# Patient Record
Sex: FEMALE | Race: WHITE | NOT HISPANIC OR LATINO
[De-identification: names, ages, dates, MRNs, and addresses within clinical notes are randomized per-mention and may not be internally consistent; named-entity substitution may affect disease eponyms.]

---

## 2021-03-15 PROBLEM — Z00.00 ENCOUNTER FOR PREVENTIVE HEALTH EXAMINATION: Status: ACTIVE | Noted: 2021-03-15

## 2021-03-29 ENCOUNTER — APPOINTMENT (OUTPATIENT)
Dept: OBGYN | Facility: CLINIC | Age: 31
End: 2021-03-29
Payer: OTHER GOVERNMENT

## 2021-03-29 VITALS — WEIGHT: 113 LBS | TEMPERATURE: 97.6 F | HEIGHT: 60 IN | BODY MASS INDEX: 22.19 KG/M2

## 2021-03-29 DIAGNOSIS — R10.2 PELVIC AND PERINEAL PAIN: ICD-10-CM

## 2021-03-29 DIAGNOSIS — N89.8 OTHER SPECIFIED NONINFLAMMATORY DISORDERS OF VAGINA: ICD-10-CM

## 2021-03-29 LAB
BILIRUB UR QL STRIP: NORMAL
CLARITY UR: CLEAR
GLUCOSE UR-MCNC: NORMAL
HCG UR QL: NORMAL EU/DL
HGB UR QL STRIP.AUTO: NORMAL
KETONES UR-MCNC: NORMAL
LEUKOCYTE ESTERASE UR QL STRIP: NORMAL
NITRITE UR QL STRIP: NORMAL
PH UR STRIP: 5
PROT UR STRIP-MCNC: NORMAL
SP GR UR STRIP: 1.02

## 2021-03-29 PROCEDURE — 81003 URINALYSIS AUTO W/O SCOPE: CPT | Mod: QW

## 2021-03-29 PROCEDURE — 99385 PREV VISIT NEW AGE 18-39: CPT

## 2021-03-29 PROCEDURE — 99072 ADDL SUPL MATRL&STAF TM PHE: CPT

## 2021-03-30 ENCOUNTER — LABORATORY RESULT (OUTPATIENT)
Age: 31
End: 2021-03-30

## 2021-04-06 ENCOUNTER — NON-APPOINTMENT (OUTPATIENT)
Age: 31
End: 2021-04-06

## 2021-04-06 LAB
A VAGINAE DNA VAG QL NAA+PROBE: NORMAL
BVAB2 DNA VAG QL NAA+PROBE: NORMAL
C KRUSEI DNA VAG QL NAA+PROBE: NEGATIVE
C TRACH RRNA SPEC QL NAA+PROBE: NEGATIVE
CYTOLOGY CVX/VAG DOC THIN PREP: NORMAL
HPV HIGH+LOW RISK DNA PNL CVX: DETECTED
MEGA1 DNA VAG QL NAA+PROBE: NORMAL
N GONORRHOEA RRNA SPEC QL NAA+PROBE: NEGATIVE
T VAGINALIS RRNA SPEC QL NAA+PROBE: NEGATIVE

## 2021-04-12 ENCOUNTER — APPOINTMENT (OUTPATIENT)
Dept: OBGYN | Facility: CLINIC | Age: 31
End: 2021-04-12
Payer: OTHER GOVERNMENT

## 2021-04-12 ENCOUNTER — LABORATORY RESULT (OUTPATIENT)
Age: 31
End: 2021-04-12

## 2021-04-12 VITALS — HEIGHT: 60 IN | WEIGHT: 113 LBS | BODY MASS INDEX: 22.19 KG/M2 | TEMPERATURE: 97.7 F

## 2021-04-12 DIAGNOSIS — Z86.19 PERSONAL HISTORY OF OTHER INFECTIOUS AND PARASITIC DISEASES: ICD-10-CM

## 2021-04-12 PROCEDURE — 57454 BX/CURETT OF CERVIX W/SCOPE: CPT

## 2021-04-12 PROCEDURE — 99072 ADDL SUPL MATRL&STAF TM PHE: CPT

## 2021-04-12 NOTE — PROCEDURE
[Colposcopy] : Colposcopy  [Consent Obtained] : Consent obtained [Risks] : risks [Benefits] : benefits [Alternatives] : alternatives [Patient] : patient [HPV High Risk] : HPV high risk [No Premedication] : no premedication [Colposcopy Adequate] : colposcopy adequate [Pap Performed] : pap not performed [SCI Fully Visualized] : SCI fully visualized [No Abnormalities] : no abnormalities [Biopsy] : biopsy taken [Hemostasis Obtained] : Hemostasis obtained [Tolerated Well] : the patient tolerated the procedure well [de-identified] : Pap normal [de-identified] : 2 [de-identified] : (ECC) Endocervix and Cervix at 1 oclock [de-identified] : Monsels and pressure [de-identified] : HPV effect

## 2021-04-19 ENCOUNTER — NON-APPOINTMENT (OUTPATIENT)
Age: 31
End: 2021-04-19

## 2021-04-29 ENCOUNTER — APPOINTMENT (OUTPATIENT)
Dept: OBGYN | Facility: CLINIC | Age: 31
End: 2021-04-29
Payer: OTHER GOVERNMENT

## 2021-04-29 VITALS — HEIGHT: 60 IN | BODY MASS INDEX: 22.19 KG/M2 | TEMPERATURE: 97.8 F | WEIGHT: 113 LBS

## 2021-04-29 DIAGNOSIS — N92.0 EXCESSIVE AND FREQUENT MENSTRUATION WITH REGULAR CYCLE: ICD-10-CM

## 2021-04-29 PROCEDURE — 99072 ADDL SUPL MATRL&STAF TM PHE: CPT

## 2021-04-29 PROCEDURE — 58300 INSERT INTRAUTERINE DEVICE: CPT

## 2021-04-29 PROCEDURE — 58301 REMOVE INTRAUTERINE DEVICE: CPT

## 2021-04-29 NOTE — PROCEDURE
[IUD Placement] : intrauterine device (IUD) placement [Consent Obtained] : Consent obtained [Prevention of Pregnancy] : prevention of pregnancy [Abnormal Uterine Bleeding] : abnormal uterine bleeding [No Premedication] : No premedication [Tenaculum] : Tenaculum [Easy Passage] : Easy passage [Mirena IUD] : Mirena IUD [None] : None [IUD Removal] : intrauterine device (IUD) removal [Menorrhagia] : menorrhagia [Risks] : risks [Benefits] : benefits [Alternatives] : alternatives [Patient] : patient [Speculum Placed] : speculum placed [IUD Removed - Forceps] : IUD removed - forceps [IUD Discarded] : IUD discarded [Tolerated Well] : Patient tolerated the procedure well [No Complications] : no complications [___ Week(s)] : in [unfilled] week(s) [LMPDate] : now [de-identified] : [id number] [de-identified] : May 2023

## 2021-05-13 ENCOUNTER — APPOINTMENT (OUTPATIENT)
Dept: OBGYN | Facility: CLINIC | Age: 31
End: 2021-05-13

## 2022-08-15 ENCOUNTER — EMERGENCY (EMERGENCY)
Facility: HOSPITAL | Age: 32
LOS: 0 days | Discharge: HOME | End: 2022-08-15
Attending: EMERGENCY MEDICINE | Admitting: EMERGENCY MEDICINE

## 2022-08-15 VITALS
WEIGHT: 108.03 LBS | OXYGEN SATURATION: 100 % | DIASTOLIC BLOOD PRESSURE: 59 MMHG | HEART RATE: 95 BPM | RESPIRATION RATE: 18 BRPM | TEMPERATURE: 98 F | SYSTOLIC BLOOD PRESSURE: 116 MMHG

## 2022-08-15 DIAGNOSIS — S05.01XA INJURY OF CONJUNCTIVA AND CORNEAL ABRASION WITHOUT FOREIGN BODY, RIGHT EYE, INITIAL ENCOUNTER: ICD-10-CM
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
## 2022-08-15 DIAGNOSIS — H57.11 OCULAR PAIN, RIGHT EYE: ICD-10-CM

## 2022-08-15 DIAGNOSIS — H57.89 OTHER SPECIFIED DISORDERS OF EYE AND ADNEXA: ICD-10-CM
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
## 2022-08-15 PROCEDURE — 99283 EMERGENCY DEPT VISIT LOW MDM: CPT

## 2022-08-15 RX ORDER — FLUORESCEIN SODIUM 9 MG
1 STRIP OPHTHALMIC (EYE) ONCE
Refills: 0 | Status: COMPLETED | OUTPATIENT
Start: 2022-08-15 | End: 2022-08-15

## 2022-08-15 RX ORDER — FAMOTIDINE 10 MG/ML
1 INJECTION INTRAVENOUS
Qty: 5 | Refills: 0
Start: 2022-08-15 | End: 2022-08-19

## 2022-08-15 RX ORDER — KETOROLAC TROMETHAMINE 30 MG/ML
1 SYRINGE (ML) INJECTION
Qty: 15 | Refills: 0
Start: 2022-08-15 | End: 2022-08-19

## 2022-08-15 RX ORDER — POLYMYXIN B SULF/TRIMETHOPRIM 10000-1/ML
1 DROPS OPHTHALMIC (EYE) ONCE
Refills: 0 | Status: COMPLETED | OUTPATIENT
Start: 2022-08-15 | End: 2022-08-15

## 2022-08-15 RX ADMIN — Medication 1 DROP(S): at 13:14

## 2022-08-15 RX ADMIN — Medication 1 DROP(S): at 13:33

## 2022-08-15 RX ADMIN — Medication 1 APPLICATION(S): at 13:14

## 2022-08-15 NOTE — ED PROVIDER NOTE - NSFOLLOWUPCLINICS_GEN_ALL_ED_FT
[facility]  Ophthalmolgy  [address], [address]  [place] [address]  Phone: [phone]  Fax:   Follow Up Time: 1-3 Days

## 2022-08-15 NOTE — ED PROVIDER NOTE - OBJECTIVE STATEMENT
32-year-old female presents for traumatic right eye pain.  Patient was playing with her 2-year-old son yesterday when her son accidentally shot the cardboard box into her right eye.  Patient reports pain at right eye, with excessive tearing and redness.  Patient went to urgent care yesterday and had stain done showing corneal abrasion, was told she may need to be seen by an ophthalmologist.  Patient presents to ED with persistent pain.  Denies blurry vision, pain with eye movement, fever, chills, chest pain, shortness of breath, all other complaints.  No other injuries.  Patient does not wear glasses or contacts at baseline.

## 2022-08-15 NOTE — ED PROVIDER NOTE - CLINICAL SUMMARY MEDICAL DECISION MAKING FREE TEXT BOX
31-year-old female presents to the ED for traumatic right eye pain.  He was playing with her 2-year-old son and was hit with a cardboard box in the right eye.  Excessive tearing.  He was seen in the urgent care yesterday and diagnosed with corneal abrasion.  Followed back to the ED today for persistent pain.  Physical exam revealed 20/20 in both vision.  Pupils equal reactive.  Fluorescein stain revealed corneal abrasion at 30 4:00 region of the cornea.  Overall patient is well-appearing.  Toradol tablets prescribed for the patient.  Ophthalmology follow-up given.  Discharged home.

## 2022-08-15 NOTE — ED ADULT TRIAGE NOTE - CHIEF COMPLAINT QUOTE
Pt c/o corneal abrasions and pupil abrasion in R eye, Pt was seen in [facility], was recommended to be seen by ophthalmologist. Pt c/o R eye tearing and pain

## 2022-08-15 NOTE — ED PROVIDER NOTE - PATIENT PORTAL LINK FT
You can access the FollowMyHealth Patient Portal offered by [facility] by registering at the following website: [email or web address]. By joining [facility]’s FollowMyHealth portal, you will also be able to view your health information using other applications (apps) compatible with our system.

## 2022-08-15 NOTE — ED PROVIDER NOTE - NSFOLLOWUPINSTRUCTIONS_ED_ALL_ED_FT
APPLY POLYTRIM DROPS TO RIGHT EYE (2 DROPS) THREE TIMES A DAY FOR 5 DAYS. FOLLOW UP IN THE OPHTHALMOLOGY CLINIC.     Corneal Abrasion  A corneal abrasion is a scratch or injury to the clear covering over the front of your eye (cornea). Your cornea forms a clear dome that protects your eye and helps to focus your vision. Your cornea is made up of many layers. The surface layer is a single layer of cells (corneal epithelium). It is one of the most sensitive tissues in your body. A corneal abrasion can be very painful.    If a corneal abrasion is not treated, it can become infected and cause an ulcer. This can lead to scarring. A scarred cornea can affect your vision. Sometimes abrasions come back in the same area, even after the original injury has healed (recurrent erosion syndrome).    What are the causes?  This condition may be caused by:  A poke in the eye.  A gritty or irritating substance (foreign body) in the eye.  Excessive eye rubbing.  Very dry eyes.  Certain eye infections.  Contact lenses that fit poorly or are worn for a long period of time. You can also injure your cornea when putting contacts lenses in your eye or taking them out.  Eye surgery.  Sometimes, the cause is unknown.    What are the signs or symptoms?  Symptoms of this condition include:  Eye pain. The pain may get worse when your eye is open or when you move your eye.  A feeling of something stuck in your eye.  Having trouble keeping your eye open, or not being able to keep it open.  Tearing and redness.  Sensitivity to light.  Blurred vision.  Headache.  How is this diagnosed?  This condition may be diagnosed based on:  Your medical history.  Your symptoms.  An eye exam. You may work with a health care provider who specializes in diseases and conditions of the eye (ophthalmologist). Before the eye exam, numbing drops may be put into your eye. You may also have dye put in your eye with a dropper or a small paper strip. The dye makes the abrasion easy to see when your ophthalmologist examines your eye with a light. Your ophthalmologist may look at your eye through an eye scope (slit lamp).  How is this treated?  Treatment may vary depending on the cause of your condition, and it may include:  Washing out your eye.  Removing any foreign body.  Antibiotic drops or ointment to treat an infection.  Steroid drops or ointment to treat redness, irritation, or inflammation.  Pain medicine.  An eye patch to keep your eye closed.  Follow these instructions at home:  Medicines     Use eye drops or ointments as told by your eye care provider.  If you were prescribed antibiotic drops or ointment, use them as told by your eye care provider. Do not stop using the antibiotic even if you start to feel better.  Take over-the-counter and prescription medicines only as told by your eye care provider.  Do not drive or use heavy machinery while taking prescription pain medicine.  General instructions     If you have an eye patch, wear it as told by your eye care provider.  Do not drive or use machinery while wearing an eye patch. Your ability to  distances will be impaired.  Follow instructions from your eye care provider about when to remove the patch.  Ask your eye care provider whether you can use a cold, wet cloth (compress) on your eye to relieve pain.  Do not rub or touch your eye. Do not wash out your eye.  Do not wear contact lenses until your eye care provider says that this is okay.  Avoid bright light and eye strain.  Keep all follow-up visits as told by your eye care provider. This is important for preventing infection and vision loss.  Contact a health care provider if:  You continue to have eye pain and other symptoms for more than 2 days.  You develop new symptoms, such as redness, tearing, or discharge.  You have discharge that makes your eyelids stick together in the morning.  Your eye patch becomes so loose that you can blink your eye.  Symptoms return after the original abrasion has healed.  Get help right away if:  You have severe eye pain that does not get better with medicine.  You have vision loss.  Summary  A corneal abrasion is a scratch on the outer layer of the clear covering over the front of your eye (cornea).  Corneal abrasion can cause eye pain, redness, tearing, and blurred vision.  This condition is usually treated with medicine to prevent infection and scarring. You also may have to wear an eye patch to cover your eye.  Let your eye care provider know if your symptoms continue for more than 2 days.  This information is not intended to replace advice given to you by your health care provider. Make sure you discuss any questions you have with your health care provider.

## 2022-08-15 NOTE — ED PROVIDER NOTE - PHYSICAL EXAMINATION
VITAL SIGNS: I have reviewed nursing notes and confirm.  CONSTITUTIONAL: calm female in stretcher in NAD  SKIN: Warm dry, normal skin turgor  HEAD: NCAT  EYES: EOMI, PERRL, no scleral icterus. visual acuity 20/20 bl. +conjunctival injection of R eye with intermittent tearing throughout exam. Fluorescien stain exam of right eye revealed corneal abrasion at 5oclock location. no periorbital swelling. normal L eye exam.  ENT: Moist mucous membranes, normal pharynx with no erythema or exudates  NECK: Supple; non tender. Full ROM. No cervical LAD  CARD: RRR, no murmurs, rubs or gallops  RESP: clear to ausculation b/l.  No rales, rhonchi, or wheezing.  NEURO: Normal gait.  PSYCH: Cooperative, appropriate.

## 2022-08-15 NOTE — ED ADULT NURSE NOTE - OBJECTIVE STATEMENT
Pt c/o corneal abrasions and pupil abrasion in R eye, Pt was seen in [facility], was recommended to be seen by ophthalmologist. Pt c/o R eye tearing and pain

## 2022-08-15 NOTE — ED ADULT NURSE NOTE - CHIEF COMPLAINT QUOTE
Pt c/o corneal abrasions and pupil abrasion in R eye, Pt was seen in [facility], was recommended to be seen by ophthalmologist. Pt c/o R eye tearing and pain

## 2023-02-04 PROBLEM — Z78.9 OTHER SPECIFIED HEALTH STATUS: Chronic | Status: ACTIVE | Noted: 2022-08-15

## 2023-02-07 ENCOUNTER — OUTPATIENT (OUTPATIENT)
Dept: OUTPATIENT SERVICES | Facility: HOSPITAL | Age: 33
LOS: 1 days | End: 2023-02-07

## 2023-02-07 DIAGNOSIS — R92.8 OTHER ABNORMAL AND INCONCLUSIVE FINDINGS ON DIAGNOSTIC IMAGING OF BREAST: ICD-10-CM

## 2023-02-07 DIAGNOSIS — Z00.8 ENCOUNTER FOR OTHER GENERAL EXAMINATION: ICD-10-CM

## 2023-06-18 ENCOUNTER — EMERGENCY (EMERGENCY)
Facility: HOSPITAL | Age: 33
LOS: 0 days | Discharge: HOME | End: 2023-06-19
Attending: EMERGENCY MEDICINE
Payer: OTHER GOVERNMENT

## 2023-06-18 VITALS
HEART RATE: 91 BPM | OXYGEN SATURATION: 100 % | RESPIRATION RATE: 18 BRPM | SYSTOLIC BLOOD PRESSURE: 152 MMHG | WEIGHT: 106.04 LBS | TEMPERATURE: 98 F | DIASTOLIC BLOOD PRESSURE: 70 MMHG | HEIGHT: 60 IN

## 2023-06-18 VITALS
DIASTOLIC BLOOD PRESSURE: 65 MMHG | HEART RATE: 85 BPM | SYSTOLIC BLOOD PRESSURE: 113 MMHG | RESPIRATION RATE: 18 BRPM | TEMPERATURE: 99 F | OXYGEN SATURATION: 100 %

## 2023-06-18 DIAGNOSIS — R05.8 OTHER SPECIFIED COUGH: ICD-10-CM

## 2023-06-18 DIAGNOSIS — Z20.822 CONTACT WITH AND (SUSPECTED) EXPOSURE TO COVID-19: ICD-10-CM

## 2023-06-18 DIAGNOSIS — G40.909 EPILEPSY, UNSPECIFIED, NOT INTRACTABLE, WITHOUT STATUS EPILEPTICUS: ICD-10-CM

## 2023-06-18 DIAGNOSIS — Z97.5 PRESENCE OF (INTRAUTERINE) CONTRACEPTIVE DEVICE: ICD-10-CM

## 2023-06-18 DIAGNOSIS — R06.02 SHORTNESS OF BREATH: ICD-10-CM

## 2023-06-18 DIAGNOSIS — F17.290 NICOTINE DEPENDENCE, OTHER TOBACCO PRODUCT, UNCOMPLICATED: ICD-10-CM

## 2023-06-18 PROCEDURE — 93005 ELECTROCARDIOGRAM TRACING: CPT

## 2023-06-18 PROCEDURE — 99285 EMERGENCY DEPT VISIT HI MDM: CPT

## 2023-06-18 PROCEDURE — 36415 COLL VENOUS BLD VENIPUNCTURE: CPT

## 2023-06-18 PROCEDURE — 85025 COMPLETE CBC W/AUTO DIFF WBC: CPT

## 2023-06-18 PROCEDURE — 84484 ASSAY OF TROPONIN QUANT: CPT

## 2023-06-18 PROCEDURE — 0241U: CPT

## 2023-06-18 PROCEDURE — 71275 CT ANGIOGRAPHY CHEST: CPT | Mod: MA

## 2023-06-18 PROCEDURE — 71046 X-RAY EXAM CHEST 2 VIEWS: CPT

## 2023-06-18 PROCEDURE — 99285 EMERGENCY DEPT VISIT HI MDM: CPT | Mod: 25

## 2023-06-18 PROCEDURE — 94640 AIRWAY INHALATION TREATMENT: CPT

## 2023-06-18 PROCEDURE — 80053 COMPREHEN METABOLIC PANEL: CPT

## 2023-06-18 NOTE — ED ADULT TRIAGE NOTE - CHIEF COMPLAINT QUOTE
pt sts I was sick and treated with zpack but for the past 4 days I have been coughing so bad I can't catch my breath

## 2023-06-19 LAB
ALBUMIN SERPL ELPH-MCNC: 5 G/DL — SIGNIFICANT CHANGE UP (ref 3.5–5.2)
ALP SERPL-CCNC: 68 U/L — SIGNIFICANT CHANGE UP (ref 30–115)
ALT FLD-CCNC: 10 U/L — SIGNIFICANT CHANGE UP (ref 0–41)
ANION GAP SERPL CALC-SCNC: 15 MMOL/L — HIGH (ref 7–14)
AST SERPL-CCNC: 13 U/L — SIGNIFICANT CHANGE UP (ref 0–41)
BASOPHILS # BLD AUTO: 0.03 K/UL — SIGNIFICANT CHANGE UP (ref 0–0.2)
BASOPHILS NFR BLD AUTO: 0.2 % — SIGNIFICANT CHANGE UP (ref 0–1)
BILIRUB SERPL-MCNC: <0.2 MG/DL — SIGNIFICANT CHANGE UP (ref 0.2–1.2)
BUN SERPL-MCNC: 13 MG/DL — SIGNIFICANT CHANGE UP (ref 10–20)
CALCIUM SERPL-MCNC: 9.8 MG/DL — SIGNIFICANT CHANGE UP (ref 8.4–10.5)
CHLORIDE SERPL-SCNC: 103 MMOL/L — SIGNIFICANT CHANGE UP (ref 98–110)
CO2 SERPL-SCNC: 23 MMOL/L — SIGNIFICANT CHANGE UP (ref 17–32)
CREAT SERPL-MCNC: 0.7 MG/DL — SIGNIFICANT CHANGE UP (ref 0.7–1.5)
EGFR: 116 ML/MIN/1.73M2 — SIGNIFICANT CHANGE UP
EOSINOPHIL # BLD AUTO: 0.03 K/UL — SIGNIFICANT CHANGE UP (ref 0–0.7)
EOSINOPHIL NFR BLD AUTO: 0.2 % — SIGNIFICANT CHANGE UP (ref 0–8)
FLUAV AG NPH QL: SIGNIFICANT CHANGE UP
FLUBV AG NPH QL: SIGNIFICANT CHANGE UP
GLUCOSE SERPL-MCNC: 148 MG/DL — HIGH (ref 70–99)
HCT VFR BLD CALC: 37.2 % — SIGNIFICANT CHANGE UP (ref 37–47)
HGB BLD-MCNC: 13.2 G/DL — SIGNIFICANT CHANGE UP (ref 12–16)
IMM GRANULOCYTES NFR BLD AUTO: 0.2 % — SIGNIFICANT CHANGE UP (ref 0.1–0.3)
LYMPHOCYTES # BLD AUTO: 1.5 K/UL — SIGNIFICANT CHANGE UP (ref 1.2–3.4)
LYMPHOCYTES # BLD AUTO: 11.9 % — LOW (ref 20.5–51.1)
MCHC RBC-ENTMCNC: 31.8 PG — HIGH (ref 27–31)
MCHC RBC-ENTMCNC: 35.5 G/DL — SIGNIFICANT CHANGE UP (ref 32–37)
MCV RBC AUTO: 89.6 FL — SIGNIFICANT CHANGE UP (ref 81–99)
MONOCYTES # BLD AUTO: 0.88 K/UL — HIGH (ref 0.1–0.6)
MONOCYTES NFR BLD AUTO: 7 % — SIGNIFICANT CHANGE UP (ref 1.7–9.3)
NEUTROPHILS # BLD AUTO: 10.11 K/UL — HIGH (ref 1.4–6.5)
NEUTROPHILS NFR BLD AUTO: 80.5 % — HIGH (ref 42.2–75.2)
NRBC # BLD: 0 /100 WBCS — SIGNIFICANT CHANGE UP (ref 0–0)
PLATELET # BLD AUTO: 370 K/UL — SIGNIFICANT CHANGE UP (ref 130–400)
PMV BLD: 9.7 FL — SIGNIFICANT CHANGE UP (ref 7.4–10.4)
POTASSIUM SERPL-MCNC: 3.6 MMOL/L — SIGNIFICANT CHANGE UP (ref 3.5–5)
POTASSIUM SERPL-SCNC: 3.6 MMOL/L — SIGNIFICANT CHANGE UP (ref 3.5–5)
PROT SERPL-MCNC: 7.7 G/DL — SIGNIFICANT CHANGE UP (ref 6–8)
RBC # BLD: 4.15 M/UL — LOW (ref 4.2–5.4)
RBC # FLD: 12.8 % — SIGNIFICANT CHANGE UP (ref 11.5–14.5)
RSV RNA NPH QL NAA+NON-PROBE: SIGNIFICANT CHANGE UP
SARS-COV-2 RNA SPEC QL NAA+PROBE: SIGNIFICANT CHANGE UP
SODIUM SERPL-SCNC: 141 MMOL/L — SIGNIFICANT CHANGE UP (ref 135–146)
TROPONIN T SERPL-MCNC: <0.01 NG/ML — SIGNIFICANT CHANGE UP
WBC # BLD: 12.58 K/UL — HIGH (ref 4.8–10.8)
WBC # FLD AUTO: 12.58 K/UL — HIGH (ref 4.8–10.8)

## 2023-06-19 PROCEDURE — 71046 X-RAY EXAM CHEST 2 VIEWS: CPT | Mod: 26

## 2023-06-19 PROCEDURE — 93010 ELECTROCARDIOGRAM REPORT: CPT | Mod: 77

## 2023-06-19 PROCEDURE — 93010 ELECTROCARDIOGRAM REPORT: CPT

## 2023-06-19 PROCEDURE — 71275 CT ANGIOGRAPHY CHEST: CPT | Mod: 26,MA

## 2023-06-19 RX ORDER — IPRATROPIUM/ALBUTEROL SULFATE 18-103MCG
3 AEROSOL WITH ADAPTER (GRAM) INHALATION
Refills: 0 | Status: COMPLETED | OUTPATIENT
Start: 2023-06-19 | End: 2023-06-19

## 2023-06-19 RX ORDER — GUAIFENESIN/DEXTROMETHORPHAN 600MG-30MG
10 TABLET, EXTENDED RELEASE 12 HR ORAL ONCE
Refills: 0 | Status: COMPLETED | OUTPATIENT
Start: 2023-06-19 | End: 2023-06-19

## 2023-06-19 RX ADMIN — Medication 3 MILLILITER(S): at 01:02

## 2023-06-19 RX ADMIN — Medication 10 MILLILITER(S): at 00:58

## 2023-06-19 RX ADMIN — Medication 50 MILLIGRAM(S): at 00:58

## 2023-06-19 RX ADMIN — Medication 3 MILLILITER(S): at 00:58

## 2023-06-19 RX ADMIN — Medication 3 MILLILITER(S): at 00:40

## 2023-06-19 NOTE — ED PROVIDER NOTE - OBJECTIVE STATEMENT
32 y/o female with no significant PMH presents to the ED for evaluation of dry cough x 4 days. pt reports she has coughing fit with breathing and talking and she can't catch her breath. pt reports tow weeks ago she had the same cough, lost her voice, and was put on a zpak and it resolved. pt reports she [place] [facility] [place] prescribed tessalon perles, medrol dose pack, and given an albuterol inhaler without relief. pt has iud with hormons. pt denies fever, chills, chest pain, sore throat, runny nose, abdominal pain, n/v/d/c, recent travel, recent sick contacts, leg pain, leg swelling, recent surgeries, recent hospitalizations, hx of cancer, fhx of blood clots, or weakness/. pt reports she vapes. 34 y/o female with a PMH pf seizure disorder on lamictal presents to the ED for evaluation of dry cough x 4 days. pt reports she has coughing fit with breathing and talking and she can't catch her breath. pt reports tow weeks ago she had the same cough, lost her voice, and was put on a zpak and it resolved. pt reports she went to [facility] and was prescribed tessalon perles, medrol dose pack, and given an albuterol inhaler without relief. pt has iud with hormons. pt denies fever, chills, chest pain, sore throat, runny nose, abdominal pain, n/v/d/c, recent travel, recent sick contacts, leg pain, leg swelling, recent surgeries, recent hospitalizations, hx of cancer, fhx of blood clots, or weakness/. pt reports she vapes. [Negative] : Genitourinary

## 2023-06-19 NOTE — ED PROVIDER NOTE - CLINICAL SUMMARY MEDICAL DECISION MAKING FREE TEXT BOX
33-year-old female with history of seizures on Lamictal, in ER with c/o of SOB and cough.  Patient states she had cough/SOB a few weeks ago, was put on Z-Juaquin and improved initially, but then symptoms returned.  Now having worsening SOB, feels like she cannot catch her breath.  + Continued cough.  No F/C.  No CP.  When recently went to an [facility] and given Tessalon Perles, albuterol inhaler, Medrol Dosepak with no improvement in symptoms.  No LE pain/swelling.  No abdominal pain.  No N/V/D.  No HA/dizziness/syncope.  + IUD, no OCP.  + Vapes nicotine.  No recent travel.  PE - nad, nc/at, eomi, perrl, op - clear, mmm, neck supple, cta b/l, no w/r/r, rrr, abd- soft, nt/nd, nabs, from x 4, no LE swelling/tenderness, A&O x 3, cn 2-12 intact, no focal motor/sensory deficits.   -Labs reviewed and unremarkable.  CTA chest negative for PE.  Nasal swab sent for flu/COVID.  Patient given albuterol, Robitussin, prednisone in ER with improvement in symptoms.  Results of labs and imaging discussed with patient.  To DC home, patient to follow-up with PMD/pulmonary.  Told to return to ER if she feels worse, or for any new/concerning symptoms.  Patient understands and agrees with plan.

## 2023-06-19 NOTE — ED PROVIDER NOTE - PATIENT PORTAL LINK FT
You can access the FollowMyHealth Patient Portal offered by [facility] by registering at the following website: [email or web address]. By joining [facility]’s FollowMyHealth portal, you will also be able to view your health information using other applications (apps) compatible with our system.

## 2023-06-19 NOTE — ED PROVIDER NOTE - NSFOLLOWUPINSTRUCTIONS_ED_ALL_ED_FT
Our Emergency Department Referral Coordinators will be reaching out to you in the next 24-48 hours from 9:00am to 5:00pm with a follow up appointment. Please expect a phone call from the hospital in that time frame. If you do not receive a call or if you have any questions or concerns, you can reach them at   [phone]     Cough    Coughing is a reflex that clears your throat and your airways. Coughing helps to heal and protect your lungs. It is normal to cough occasionally, but a cough that happens with other symptoms or lasts a long time may be a sign of a condition that needs treatment. Coughing may be caused by infections, asthma or COPD, smoking, postnasal drip, gastroesophageal reflux, as well as other medical conditions. Take medicines only as instructed by your health care provider. Avoid environments or triggers that causes you to cough at work or at home.    SEEK IMMEDIATE MEDICAL CARE IF YOU HAVE ANY OF THE FOLLOWING SYMPTOMS: coughing up blood, shortness of breath, rapid heart rate, chest pain, unexplained weight loss or night sweats.

## 2023-06-19 NOTE — ED PROVIDER NOTE - PHYSICAL EXAMINATION
Physical Exam    Vital Signs: I have reviewed the initial vital signs.  Constitutional: well-nourished, appears stated age, no acute distress  Eyes: Conjunctiva pink, Sclera clear  Cardiovascular: S1 and S2, regular rate, regular rhythm, well-perfused extremities, radial pulses equal and 2+ b/l.   Respiratory: unlabored respiratory effort, clear to auscultation bilaterally no wheezing, rales and rhonchi. pt is speaking full sentences. no accessory muscle use.   Musculoskeletal: FROM, of b/l upper and lower extremities. no lower extremity edema, no calf tenderness  Integumentary: warm, dry, no rash  Neurologic: awake, alert  Psychiatric: appropriate mood, appropriate affect

## 2023-06-19 NOTE — ED PROVIDER NOTE - CARE PROVIDER_API CALL
Anupam Hale E  Pulmonary Disease  [address] [place] [id number]  Phone: [phone]  Fax: [phone]  Follow Up Time: 7-10 Days

## 2023-07-25 ENCOUNTER — APPOINTMENT (OUTPATIENT)
Dept: PULMONOLOGY | Facility: CLINIC | Age: 33
End: 2023-07-25

## 2024-05-06 ENCOUNTER — LABORATORY RESULT (OUTPATIENT)
Age: 34
End: 2024-05-06

## 2024-05-07 ENCOUNTER — APPOINTMENT (OUTPATIENT)
Dept: OBGYN | Facility: CLINIC | Age: 34
End: 2024-05-07
Payer: OTHER GOVERNMENT

## 2024-05-07 VITALS — HEIGHT: 60 IN | BODY MASS INDEX: 21.2 KG/M2 | WEIGHT: 108 LBS

## 2024-05-07 DIAGNOSIS — Z97.5 PRESENCE OF (INTRAUTERINE) CONTRACEPTIVE DEVICE: ICD-10-CM

## 2024-05-07 DIAGNOSIS — Z01.419 ENCOUNTER FOR GYNECOLOGICAL EXAMINATION (GENERAL) (ROUTINE) W/OUT ABNORMAL FINDINGS: ICD-10-CM

## 2024-05-07 DIAGNOSIS — R87.810 CERVICAL HIGH RISK HUMAN PAPILLOMAVIRUS (HPV) DNA TEST POSITIVE: ICD-10-CM

## 2024-05-07 PROCEDURE — 99395 PREV VISIT EST AGE 18-39: CPT

## 2024-05-12 PROBLEM — R87.810 CERVICAL HIGH RISK HUMAN PAPILLOMAVIRUS (HPV) DNA TEST POSITIVE: Status: ACTIVE | Noted: 2024-05-12

## 2024-05-12 PROBLEM — Z01.419 WELL WOMAN EXAM WITH ROUTINE GYNECOLOGICAL EXAM: Status: ACTIVE | Noted: 2024-05-12

## 2024-05-12 PROBLEM — Z97.5 IUD (INTRAUTERINE DEVICE) IN PLACE: Status: ACTIVE | Noted: 2021-03-29

## 2024-05-12 NOTE — DISCUSSION/SUMMARY
[FreeTextEntry1] : 33 YEAR OLD FEMALE LMP 3/22/2021 WITH MIRENA IUD PRESENTS FOR GYNECOLOGIC VISIT FOR WELL WOMAN VISIT AND FOR FOLLOW UP PAP AND HPV HR TESTING OF CERVIX. PREVIOUSL WITH  HPV HR POSITIVE TESTING OF CERVIX. PATIENT HAS HAD MIRENA IUD SINCE 2020.  MOST RECENT VISIT WAS 3/29/2021 AT WHICH TIME PAP WAS NEGAITVE BUT HPV HR TESTING WAS POSITIVE FOR HPV HR WITH NEGATIVE 16, 18.45.  COLPOSCOPY WAS DONE AT THAT TIME WITH NEGATIVE ECC AND BIOPSY. NO NEW MEDICAL OR SURGICAL HISTORY SINCE LAST VISIT. MEDICATIONS; LAMOTRIGIINE MEDICATION ALLERGIES; NONE ROS;BMS-COSTIPATION IMPROVED WITH FIBER; NO FAM HISTORY OF COLON CANCER          NO URINARY COMPLAINTS          SEXUALLY ACTIVE WITHOUT COMPLAINTS- MIRENA IUD BERASTS; DOES BREST SELF EXAMINATION                    NO FAM HISTORY OF BREAST CANCER                    BREAST IMAGING AT [facility] IN [place] - FOLLOWING RT DECRESED EXCECISE LATELY; NO MULTIVITMAINS; +DAIRY/YOGURT/CHEESE; -TOBACCO; + VAP  PE;/62; TEMP              WEIGHT 108 LBS HEIGHT 5'0"       BREASTS; NO MASSES OR DISCHARGES       ABDOMEN;SOFT, NO MASSES; NO TENDERNESS TO PALPATION; LOW TRANSVERSE SCAR       PELVIC NORMAL EXTERNAL GENITALIA                     NORMAL URETHRAL MEATUS                     NORMAL VAGINA WITHOUT LESION                     SMALL ANTEVERTED UTERUS ON  BIMANUAL EXAMINATION                     NO PALPABLE ADNEXAL MASSES  IMP; WELL WOMAN          IUD CONTRACEPTION          SECONDARY AMENORRHEA          HISTORY OF + HP HR TESTING OF CERVIX.  PLAN; THIN PREP PAP WITH HR HPV TESTING DONE-PT TO CALL IN 2 WKS FOR RESULTS             BREAST SELF EXAMINATION MONTHLY CONTINUED TO BE STRONGLY ADVISED             RETURN TO  OFFICE ONE YEAR OR PRN             VAPING ADDRESSED AND CESSATION STRONGLY ADVISED             RETURN TO OFFICE ONE YEAR IF PAP AND HPV HR NEGATIVE.

## 2024-11-22 NOTE — ED ADULT TRIAGE NOTE - HEIGHT IN CM
----- Message from Junior Wang sent at 11/22/2024  8:47 AM CST -----  Regarding: FW: Reschedule Appt    ----- Message -----  From: Kofi Hart  Sent: 11/22/2024   7:13 AM CST  To: [facility] Staff  Subject: Reschedule Appt                                  Type:  Reschedule Appointment Request    Caller is requesting to reschedule appointment.      Name of Caller:Wife Polina    Date of previous appointment?11/22    Symptoms:     Would the patient rather a call back or a response via [facility]? Call back    Best Call Back Number:[phone]      Additional Information: Sts pt has Diarrhea and needs to see about rescheduling his appt from today.   Please advise -- Thank you  
152.4

## 2025-05-13 ENCOUNTER — NON-APPOINTMENT (OUTPATIENT)
Age: 35
End: 2025-05-13

## 2025-06-18 ENCOUNTER — APPOINTMENT (OUTPATIENT)
Dept: OBGYN | Facility: CLINIC | Age: 35
End: 2025-06-18
Payer: COMMERCIAL

## 2025-06-18 ENCOUNTER — LABORATORY RESULT (OUTPATIENT)
Age: 35
End: 2025-06-18

## 2025-06-18 VITALS — TEMPERATURE: 97.8 F | HEIGHT: 60 IN | WEIGHT: 108 LBS | BODY MASS INDEX: 21.2 KG/M2

## 2025-06-18 LAB
APPEARANCE: CLEAR
BILIRUBIN URINE: ABNORMAL
BLOOD URINE: ABNORMAL
COLOR: YELLOW
GLUCOSE QUALITATIVE U: NEGATIVE
KETONES URINE: NEGATIVE
LEUKOCYTE ESTERASE URINE: NEGATIVE
NITRITE URINE: NEGATIVE
PH URINE: 5.5
PROTEIN URINE: ABNORMAL
SPECIFIC GRAVITY URINE: >=1.03
UROBILINOGEN URINE: 0.2 (ref 0.2–?)

## 2025-06-18 PROCEDURE — 99395 PREV VISIT EST AGE 18-39: CPT
